# Patient Record
Sex: MALE | Race: WHITE | NOT HISPANIC OR LATINO | Employment: STUDENT | URBAN - NONMETROPOLITAN AREA
[De-identification: names, ages, dates, MRNs, and addresses within clinical notes are randomized per-mention and may not be internally consistent; named-entity substitution may affect disease eponyms.]

---
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
## 2025-01-09 ENCOUNTER — PATIENT OUTREACH (OUTPATIENT)
Dept: CARE COORDINATION | Facility: CLINIC | Age: 20
End: 2025-01-09
Payer: COMMERCIAL

## 2025-02-02 SDOH — HEALTH STABILITY: PHYSICAL HEALTH: ON AVERAGE, HOW MANY MINUTES DO YOU ENGAGE IN EXERCISE AT THIS LEVEL?: 90 MIN

## 2025-02-02 SDOH — HEALTH STABILITY: PHYSICAL HEALTH: ON AVERAGE, HOW MANY DAYS PER WEEK DO YOU ENGAGE IN MODERATE TO STRENUOUS EXERCISE (LIKE A BRISK WALK)?: 7 DAYS

## 2025-02-02 ASSESSMENT — SOCIAL DETERMINANTS OF HEALTH (SDOH): HOW OFTEN DO YOU GET TOGETHER WITH FRIENDS OR RELATIVES?: MORE THAN THREE TIMES A WEEK

## 2025-02-03 ENCOUNTER — OFFICE VISIT (OUTPATIENT)
Dept: FAMILY MEDICINE | Facility: CLINIC | Age: 20
End: 2025-02-03
Attending: PHYSICIAN ASSISTANT
Payer: COMMERCIAL

## 2025-02-03 VITALS
SYSTOLIC BLOOD PRESSURE: 122 MMHG | HEIGHT: 72 IN | WEIGHT: 131 LBS | OXYGEN SATURATION: 98 % | BODY MASS INDEX: 17.74 KG/M2 | HEART RATE: 96 BPM | DIASTOLIC BLOOD PRESSURE: 70 MMHG | TEMPERATURE: 98.6 F

## 2025-02-03 DIAGNOSIS — K21.00 GASTROESOPHAGEAL REFLUX DISEASE WITH ESOPHAGITIS WITHOUT HEMORRHAGE: ICD-10-CM

## 2025-02-03 DIAGNOSIS — E55.9 VITAMIN D DEFICIENCY: ICD-10-CM

## 2025-02-03 DIAGNOSIS — Z00.00 ROUTINE GENERAL MEDICAL EXAMINATION AT A HEALTH CARE FACILITY: Primary | ICD-10-CM

## 2025-02-03 DIAGNOSIS — K58.1 IRRITABLE BOWEL SYNDROME WITH CONSTIPATION: ICD-10-CM

## 2025-02-03 DIAGNOSIS — R17 SERUM TOTAL BILIRUBIN ELEVATED: ICD-10-CM

## 2025-02-03 LAB
ALBUMIN SERPL BCG-MCNC: 4.5 G/DL (ref 3.5–5.2)
ALP SERPL-CCNC: 84 U/L (ref 40–150)
ALT SERPL W P-5'-P-CCNC: 17 U/L (ref 0–70)
ANION GAP SERPL CALCULATED.3IONS-SCNC: 11 MMOL/L (ref 7–15)
AST SERPL W P-5'-P-CCNC: 13 U/L (ref 0–45)
BILIRUB SERPL-MCNC: 0.9 MG/DL
BUN SERPL-MCNC: 19.9 MG/DL (ref 6–20)
CALCIUM SERPL-MCNC: 9.5 MG/DL (ref 8.8–10.4)
CHLORIDE SERPL-SCNC: 104 MMOL/L (ref 98–107)
CREAT SERPL-MCNC: 0.74 MG/DL (ref 0.67–1.17)
EGFRCR SERPLBLD CKD-EPI 2021: >90 ML/MIN/1.73M2
GLUCOSE SERPL-MCNC: 75 MG/DL (ref 70–99)
HCO3 SERPL-SCNC: 25 MMOL/L (ref 22–29)
POTASSIUM SERPL-SCNC: 4.2 MMOL/L (ref 3.4–5.3)
PROT SERPL-MCNC: 6.6 G/DL (ref 6.4–8.3)
SODIUM SERPL-SCNC: 140 MMOL/L (ref 135–145)
VIT D+METAB SERPL-MCNC: 17 NG/ML (ref 20–50)

## 2025-02-03 PROCEDURE — 80053 COMPREHEN METABOLIC PANEL: CPT | Performed by: PHYSICIAN ASSISTANT

## 2025-02-03 PROCEDURE — 36415 COLL VENOUS BLD VENIPUNCTURE: CPT | Performed by: PHYSICIAN ASSISTANT

## 2025-02-03 PROCEDURE — 82306 VITAMIN D 25 HYDROXY: CPT | Performed by: PHYSICIAN ASSISTANT

## 2025-02-03 PROCEDURE — 99395 PREV VISIT EST AGE 18-39: CPT | Performed by: PHYSICIAN ASSISTANT

## 2025-02-03 PROCEDURE — 99214 OFFICE O/P EST MOD 30 MIN: CPT | Mod: 25 | Performed by: PHYSICIAN ASSISTANT

## 2025-02-03 RX ORDER — PANTOPRAZOLE SODIUM 40 MG/1
40 TABLET, DELAYED RELEASE ORAL DAILY
Qty: 90 TABLET | Refills: 0 | Status: SHIPPED | OUTPATIENT
Start: 2025-02-03

## 2025-02-03 RX ORDER — CETIRIZINE HYDROCHLORIDE 5 MG/1
5 TABLET ORAL DAILY
COMMUNITY

## 2025-02-03 NOTE — PATIENT INSTRUCTIONS
Continue the zyrtec as you have been doing - this is a very safe medication and it seems to help him so okay to continue    Try the protonix (pantroprazole) - this would be in place of the omeprazole that he did not tolerate. See how things go over the next 2-4 weeks but if he has the same issues as he did with omeprazole then go ahead and stop      Okay to continue the bowel regimen you have been doing to keep him regular        Patient Education   Preventive Care Advice   This is general advice given by our system to help you stay healthy. However, your care team may have specific advice just for you. Please talk to your care team about your preventive care needs.  Nutrition  Eat 5 or more servings of fruits and vegetables each day.  Try wheat bread, brown rice and whole grain pasta (instead of white bread, rice, and pasta).  Get enough calcium and vitamin D. Check the label on foods and aim for 100% of the RDA (recommended daily allowance).  Lifestyle  Exercise at least 150 minutes each week  (30 minutes a day, 5 days a week).  Do muscle strengthening activities 2 days a week. These help control your weight and prevent disease.  No smoking.  Wear sunscreen to prevent skin cancer.  Have a dental exam and cleaning every 6 months.  Yearly exams  See your health care team every year to talk about:  Any changes in your health.  Any medicines your care team has prescribed.  Preventive care, family planning, and ways to prevent chronic diseases.  Shots (vaccines)   HPV shots (up to age 26), if you've never had them before.  Hepatitis B shots (up to age 59), if you've never had them before.  COVID-19 shot: Get this shot when it's due.  Flu shot: Get a flu shot every year.  Tetanus shot: Get a tetanus shot every 10 years.  Pneumococcal, hepatitis A, and RSV shots: Ask your care team if you need these based on your risk.  Shingles shot (for age 50 and up)  General health tests  Diabetes screening:  Starting at age 35, Get  screened for diabetes at least every 3 years.  If you are younger than age 35, ask your care team if you should be screened for diabetes.  Cholesterol test: At age 39, start having a cholesterol test every 5 years, or more often if advised.  Bone density scan (DEXA): At age 50, ask your care team if you should have this scan for osteoporosis (brittle bones).  Hepatitis C: Get tested at least once in your life.  STIs (sexually transmitted infections)  Before age 24: Ask your care team if you should be screened for STIs.  After age 24: Get screened for STIs if you're at risk. You are at risk for STIs (including HIV) if:  You are sexually active with more than one person.  You don't use condoms every time.  You or a partner was diagnosed with a sexually transmitted infection.  If you are at risk for HIV, ask about PrEP medicine to prevent HIV.  Get tested for HIV at least once in your life, whether you are at risk for HIV or not.  Cancer screening tests  Cervical cancer screening: If you have a cervix, begin getting regular cervical cancer screening tests starting at age 21.  Breast cancer scan (mammogram): If you've ever had breasts, begin having regular mammograms starting at age 40. This is a scan to check for breast cancer.  Colon cancer screening: It is important to start screening for colon cancer at age 45.  Have a colonoscopy test every 10 years (or more often if you're at risk) Or, ask your provider about stool tests like a FIT test every year or Cologuard test every 3 years.  To learn more about your testing options, visit:   .  For help making a decision, visit:   https://bit.ly/kc37738.  Prostate cancer screening test: If you have a prostate, ask your care team if a prostate cancer screening test (PSA) at age 55 is right for you.  Lung cancer screening: If you are a current or former smoker ages 50 to 80, ask your care team if ongoing lung cancer screenings are right for you.  For informational purposes  only. Not to replace the advice of your health care provider. Copyright   2023 [facility]. All rights reserved. Clinically reviewed by the [facility]. [employer] [id number] - REV 01/24.  Learning About Stress  What is stress?     Stress is your body's response to a hard situation. Your body can have a physical, emotional, or mental response. Stress is a fact of life for most people, and it affects everyone differently. What causes stress for you may not be stressful for someone else.  A lot of things can cause stress. You may feel stress when you go on a job interview, take a test, or run a race. This kind of short-term stress is normal and even useful. It can help you if you need to work hard or react quickly. For example, stress can help you finish an important job on time.  Long-term stress is caused by ongoing stressful situations or events. Examples of long-term stress include long-term health problems, ongoing problems at work, or conflicts in your family. Long-term stress can harm your health.  How does stress affect your health?  When you are stressed, your body responds as though you are in danger. It makes hormones that speed up your heart, make you breathe faster, and give you a burst of energy. This is called the fight-or-flight stress response. If the stress is over quickly, your body goes back to normal and no harm is done.  But if stress happens too often or lasts too long, it can have bad effects. Long-term stress can make you more likely to get sick, and it can make symptoms of some diseases worse. If you tense up when you are stressed, you may develop neck, shoulder, or low back pain. Stress is linked to high blood pressure and heart disease.  Stress also harms your emotional health. It can make you barnett, tense, or depressed. Your relationships may suffer, and you may not do well at work or school.  What can you do to manage stress?  You can try these things to  help manage stress:   Do something active. Exercise or activity can help reduce stress. Walking is a great way to get started. Even everyday activities such as housecleaning or yard work can help.  Try yoga or roni chi. These techniques combine exercise and meditation. You may need some training at first to learn them.  Do something you enjoy. For example, listen to music or go to a movie. Practice your hobby or do volunteer work.  Meditate. This can help you relax, because you are not worrying about what happened before or what may happen in the future.  Do guided imagery. Imagine yourself in any setting that helps you feel calm. You can use online videos, books, or a teacher to guide you.  Do breathing exercises. For example:  From a standing position, bend forward from the waist with your knees slightly bent. Let your arms dangle close to the floor.  Breathe in slowly and deeply as you return to a standing position. Roll up slowly and lift your head last.  Hold your breath for just a few seconds in the standing position.  Breathe out slowly and bend forward from the waist.  Let your feelings out. Talk, laugh, cry, and express anger when you need to. Talking with supportive friends or family, a counselor, or a lópez leader about your feelings is a healthy way to relieve stress. Avoid discussing your feelings with people who make you feel worse.  Write. It may help to write about things that are bothering you. This helps you find out how much stress you feel and what is causing it. When you know this, you can find better ways to cope.  What can you do to prevent stress?  You might try some of these things to help prevent stress:  Manage your time. This helps you find time to do the things you want and need to do.  Get enough sleep. Your body recovers from the stresses of the day while you are sleeping.  Get support. Your family, friends, and community can make a difference in how you experience stress.  Limit your  "news feed. Avoid or limit time on social media or news that may make you feel stressed.  Do something active. Exercise or activity can help reduce stress. Walking is a great way to get started.  Where can you learn more?  Go to https://www.[employer].net/patiented  Enter N032 in the search box to learn more about \"Learning About Stress.\"  Current as of: October 24, 2023  Content Version: 14.3    2024 [employer].   Care instructions adapted under license by your healthcare professional. If you have questions about a medical condition or this instruction, always ask your healthcare professional. [employer] disclaims any warranty or liability for your use of this information.       "

## 2025-02-03 NOTE — PROGRESS NOTES
Preventive Care Visit  [facility] MARQUES Steve PA-C, Family Medicine  Feb 3, 2025      Assessment & Plan     (Z00.00) Routine general medical examination at a health care facility  (primary encounter diagnosis)  Comment:   Plan:     (K21.00) Gastroesophageal reflux disease with esophagitis without hemorrhage  Comment: did not tolerate omeprazole. Will try pantoprazole to see if that helps with gut issues although they do seem to be managing with his current bowel regimen and zyrtec so if pantoprazole not tolerated or does not add any benefit, would discontinue  Plan: pantoprazole (PROTONIX) 40 MG EC tablet            (E55.9) Vitamin D deficiency  Comment: previous level low. Did high dose replacement but has not been on anything since completing the 12 weeks  Plan: Vitamin D Deficiency            (R17) Serum total bilirubin elevated  Comment: follow up - given we are attempting labs today for the vitamin D, will recheck liver panel as well  Plan: Comprehensive metabolic panel (BMP + Alb, Alk         Phos, ALT, AST, Total. Bili, TP)            (K58.1) Irritable bowel syndrome with constipation  Comment:   Plan: continue current regimen. Scheduled to follow up with GI in April         Patient has been advised of split billing requirements and indicates understanding: Yes        Counseling  Appropriate preventive services were addressed with this patient via screening, questionnaire, or discussion as appropriate for fall prevention, nutrition, physical activity, Tobacco-use cessation, social engagement, weight loss and cognition.  Checklist reviewing preventive services available has been given to the patient.  Reviewed patient's diet, addressing concerns and/or questions.   He is at risk for psychosocial distress and has been provided with information to reduce risk.       Grzegorz Benson is a 20 year old, presenting for the following:  Physical        2/3/2025     8:13 AM   Additional  Questions   Roomed by PHUC Ramirez          HPI    -Mom would like to talk about follow up labs.     After his annual visit last year got sick  Was losing weight, not eating  Ended up having an EGD that showed reflux esophagitis  Mom states they never really explained why he was having all the bowel issues  She feels he was so backed up he wouldn't eat - ? Gastroparesis  Bilirubin was elevated  Vitamin D level was low - he did take the high dose vitamin D for 12 weeks but has not been on anything since completing that  They do have a follow up with GI in April     Mom has been doing the following bowel regimen:   2-3 colace daily, Every 2+ weeks he will have a decreased appetite and she can tell that he is getting more backed up despite the stool softeners so will give him a laxative that clears everything out and then he will be okay. At most doing this 2x per month  They have tried miralax in the past but that didn't seem to work for him    She had him on omeprazole as advised after the EGD but he started to refuse to take them and spit them out and then they were noticing some more unusual behaviors from him. Wasn't sleeping well, very restless. These improved when she stopped the omeprazole    They had tried famotidine a year ago when he was having all the symptoms and she said it didn't work for him    Mom has been giving him zyrtec and she feels like this helps - she isn't sure how or why but when she stops giving it he will start to clear his throat and not eat as much like he was doing before      Health Care Directive  Patient has a Health Care Directive on file  Advance care planning document is on file and is current.      2/2/2025   General Health   How would you rate your overall physical health? Good    Feel stress (tense, anxious, or unable to sleep) Rather much        Proxy-reported   (!) STRESS CONCERN      2/2/2025   Nutrition   Three or more servings of calcium each day? Yes    Diet: Regular (no  restrictions)    How many servings of fruit and vegetables per day? (!) 2-3    How many sweetened beverages each day? 0-1        Proxy-reported         2/2/2025   Exercise   Days per week of moderate/strenous exercise 7 days    Average minutes spent exercising at this level 90 min        Proxy-reported         2/2/2025   Social Factors   Frequency of gathering with friends or relatives More than three times a week    Worry food won't last until get money to buy more No    Food not last or not have enough money for food? No    Do you have housing? (Housing is defined as stable permanent housing and does not include staying ouside in a car, in a tent, in an abandoned building, in an overnight shelter, or couch-surfing.) Yes    Are you worried about losing your housing? No    Lack of transportation? No    Unable to get utilities (heat,electricity)? No        Proxy-reported         2/2/2025   Dental   Dentist two times every year? Yes        Proxy-reported         2/2/2025   TB Screening   Were you born outside of the [place]? No        Proxy-reported         Today's PHQ-2 Score:       2/2/2025    12:30 PM   PHQ-2 ( 1999 [employer])   PHQ-2 Score Incomplete        Proxy-reported           2/2/2025   Substance Use   Alcohol more than 3/day or more than 7/wk Not Applicable    Do you use any other substances recreationally? No        Proxy-reported     Social History     Tobacco Use    Smoking status: Never    Smokeless tobacco: Never   Vaping Use    Vaping status: Never Used   Substance Use Topics    Alcohol use: No     Alcohol/week: 0.0 standard drinks of alcohol    Drug use: No           2/2/2025   STI Screening   New sexual partner(s) since last STI/HIV test? No        Proxy-reported         2/2/2025   Contraception/Family Planning   Questions about contraception or family planning No        Proxy-reported       Reviewed and updated as needed this visit by Provider                    BP Readings from Last 3 Encounters:  "  02/03/25 122/70   02/26/24 107/69   02/22/24 110/60    Wt Readings from Last 3 Encounters:   02/03/25 59.4 kg (131 lb)   05/13/24 52.4 kg (115 lb 8 oz) (2%, Z= -2.05)*   02/26/24 50.8 kg (112 lb) (1%, Z= -2.28)*     * Growth percentiles are based on [facility] (Boys, 2-20 Years) data.                      Review of Systems  Constitutional, HEENT, cardiovascular, pulmonary, GI, , musculoskeletal, neuro, skin, endocrine and psych systems are negative, except as otherwise noted.     Objective    Exam  /70   Pulse 96   Temp 98.6  F (37  C) (Tympanic)   Ht 1.816 m (5' 11.5\")   Wt 59.4 kg (131 lb)   SpO2 98%   BMI 18.02 kg/m     Estimated body mass index is 18.02 kg/m  as calculated from the following:    Height as of this encounter: 1.816 m (5' 11.5\").    Weight as of this encounter: 59.4 kg (131 lb).    Physical Exam  GENERAL: alert and no distress  EYES: Eyes grossly normal to inspection, PERRL and conjunctivae and sclerae normal  HENT: ear canals and TM's normal, nose and mouth without ulcers or lesions  NECK: no adenopathy, no asymmetry, masses, or scars  RESP: lungs clear to auscultation - no rales, rhonchi or wheezes  CV: regular rate and rhythm, normal S1 S2, no S3 or S4, no murmur, click or rub, no peripheral edema  ABDOMEN: soft, nontender, no hepatosplenomegaly, no masses and bowel sounds normal  MS: no gross musculoskeletal defects noted, no edema  SKIN: no suspicious lesions or rashes  NEURO: Normal strength and tone, mentation intact and speech normal  PSYCH: mentation appears normal, affect normal/bright        Signed Electronically by: Rachel Steve PA-C    "

## 2025-02-04 DIAGNOSIS — E55.9 VITAMIN D DEFICIENCY: Primary | ICD-10-CM

## 2025-02-04 RX ORDER — ERGOCALCIFEROL 1.25 MG/1
50000 CAPSULE, LIQUID FILLED ORAL WEEKLY
Qty: 12 CAPSULE | Refills: 0 | Status: SHIPPED | OUTPATIENT
Start: 2025-02-04

## 2025-04-16 ENCOUNTER — OFFICE VISIT (OUTPATIENT)
Dept: GASTROENTEROLOGY | Facility: CLINIC | Age: 20
End: 2025-04-16
Payer: COMMERCIAL

## 2025-04-16 VITALS
HEIGHT: 72 IN | WEIGHT: 136.2 LBS | BODY MASS INDEX: 18.45 KG/M2 | HEART RATE: 73 BPM | SYSTOLIC BLOOD PRESSURE: 106 MMHG | OXYGEN SATURATION: 97 % | DIASTOLIC BLOOD PRESSURE: 66 MMHG

## 2025-04-16 DIAGNOSIS — R46.89 BEHAVIOR CONCERN: ICD-10-CM

## 2025-04-16 DIAGNOSIS — K58.1 IRRITABLE BOWEL SYNDROME WITH CONSTIPATION: Primary | ICD-10-CM

## 2025-04-16 DIAGNOSIS — R13.10 DYSPHAGIA, UNSPECIFIED TYPE: ICD-10-CM

## 2025-04-16 DIAGNOSIS — R63.39 FOOD AVERSION: ICD-10-CM

## 2025-04-16 DIAGNOSIS — R63.4 WEIGHT LOSS: ICD-10-CM

## 2025-04-16 NOTE — NURSING NOTE
"Chief Complaint   Patient presents with    Follow Up     Dysphagia, weight loss.      He requests these members of his care team be copied on today's visit information:  PCP: Rachel Steve    Referring Provider:  Referred Self, MD  No address on file    Vitals:    04/16/25 1123   BP: 106/66   BP Location: Left arm   Patient Position: Sitting   Cuff Size: Adult Small   Pulse: 73   SpO2: 97%   Weight: 61.8 kg (136 lb 3.2 oz)   Height: 1.816 m (5' 11.5\")     Body mass index is 18.73 kg/m .    Do you have a history of colon cancer in your immediate family? NO      Medications were reconciled.    Does patient need any medication refills at today's visit? No        Iveth River       "

## 2025-04-16 NOTE — LETTER
4/16/2025      Marcelino Ventura  [address]  [facility] [address]      Dear Colleague,    Thank you for referring your patient, Marcelino Ventura, to the [facility]. Please see a copy of my visit note below.    FOLLOW UP GI CLINIC VISIT    CC/REFERRING MD:  Referred Self  REASON FOR CONSULTATION:   Referred Self for   Chief Complaint   Patient presents with     Follow Up     Dysphagia, weight loss.        ASSESSMENT/PLAN: Marcelino is here for follow-up regarding a few concerns.  Over the past year, weight has improved as oral intake has started to normalize.  There are still some spells of indigestion and throat clearing, which seem to correlate with spells of constipation.  Maximizing OTC laxatives has only partially improved stool output.  At this time, I would recommend transition to a secretagogue, discussed the risk benefits of using something like Linzess.  They will hold other laxatives for at least 3 days when starting.  Recommend staying off PPIs for now, given behavioral change with them.  Hopefully, maintaining good stool output will resolve any upper GI distress as well.  Will follow-up over the [facility] with Esme in a couple of weeks, they were encouraged to contact me sooner with any questions or concerns.    1. Irritable bowel syndrome with constipation (Primary)  - linaclotide (LINZESS) 145 MCG capsule; Take 1 capsule (145 mcg) by mouth every morning (before breakfast).  Dispense: 30 capsule; Refill: 1    2. Dysphagia, unspecified type    3. Food aversion    4. Weight loss    5. Behavior concern    Thank you for this consultation.  It was a pleasure to participate in the care of this patient; please contact us with any further questions.      25 minutes spent on the date of the encounter doing chart review, patient visit, and documentation    This note was created with voice recognition software, and while reviewed for accuracy, typos may remain.     Bernard Chambers PA-C  Division of  Gastroenterology, Hepatology and Nutrition  [facility]  Marcelino Ventura is a 20 year old male that is seen for follow up in the GI clinic today.  Marcelino is accompanied by his mother, Esme, who helps provide most of the history. Marcelino has a medical history significant for autism spectrum disorder, mostly nonverbal.  I initially met with Marcelino a little over a year ago due to concerns of poor oral intake with associated unintentional weight loss of roughly 15 pounds.  He was only drinking small sips of water and generally refusing solids.  There had been less frequent urination and stool output.  Workup with PCP had included CT CAP which was essentially normal.  Laboratory testing to look for metabolic dysfunction was normal as well.  Previous evaluation with pediatric GI had been suggestive of IBS-C with associated nausea.    We expanded his workup after that to assess for nutritional deficiency and this largely came back normal.  We did abdominal x-ray which was notable for moderate to large stool burden.  We had them start working on some laxative therapies to improve stool output.  From there, we also referred him for an EGD which was notable for LA grade a reflux esophagitis with esophageal biopsies compatible with reflux.  Normal-appearing stomach and duodenum.    When I last met with Esme in [place] in May 2024, his appetite had been improving and oral intake getting better.  We had tried some cyproheptadine but this caused drowsiness.  He was getting a little bit better stool output with daily use of laxatives.    Over the past year, appetite and oral intake has continued to improve, now up to 136 pounds.  We had recommended omeprazole for his reflux esophagitis, but this was causing some behavioral change, so they discontinued.  Pantoprazole was tried by PCP but this has not seemed to help much with indigestion and throat clearing.  What they have noticed is  that the upper GI distress seems to come along with spells of constipation.  Despite daily use of stool softener and magnesium supplement, still needing to do a bowel cleanse with Dulcolax at least once per month, sometimes more frequently than that.  This seems to clear up the throat clearing and upper digestive symptoms.    ROS:    10 point ROS neg other than the symptoms noted above in the HPI.    PREVIOUS ENDOSCOPY:  Reviewed, see HPI    PERTINENT RELEVANT IMAGING OR LABS:  Reviewed    ALLERGIES:     Allergies   Allergen Reactions     Dust Mites      unknown     No Clinical Screening - See Comments Unknown     Sesame seeds     Peanuts  [Nuts] Unknown     Shellfish-Derived Products Unknown     Trees      Unknown  Pine trees        PERTINENT MEDICATIONS:    Current Outpatient Medications:      bisacodyl (DULCOLAX) 5 MG EC tablet, Take 5 mg by mouth daily as needed for constipation, Disp: , Rfl:      cetirizine (ZYRTEC) 5 MG tablet, Take 5 mg by mouth daily., Disp: , Rfl:      linaclotide (LINZESS) 145 MCG capsule, Take 1 capsule (145 mcg) by mouth every morning (before breakfast)., Disp: 30 capsule, Rfl: 1     magnesium chloride 535 (64 Mg) MG TBEC CR tablet, Take 535 mg by mouth daily., Disp: , Rfl:      pantoprazole (PROTONIX) 40 MG EC tablet, Take 1 tablet (40 mg) by mouth daily., Disp: 90 tablet, Rfl: 0     Probiotic Product (PROBIOTIC PO), Take by mouth daily, Disp: , Rfl:      vitamin D2 (ERGOCALCIFEROL) 50999 units (1250 mcg) capsule, Take 1 capsule (50,000 Units) by mouth once a week., Disp: 12 capsule, Rfl: 0    PROBLEM LIST  Patient Active Problem List    Diagnosis Date Noted     Protein-calorie malnutrition (H) 02/06/2024     Priority: Medium     Nausea and vomiting, intractability of vomiting not specified, unspecified vomiting type 04/04/2022     Priority: Medium     Weight loss 12/06/2021     Priority: Medium     Irritable bowel syndrome with constipation 12/06/2021     Priority: Medium     Behavior  concern 12/13/2016     Priority: Medium     Screening for cholesterol level 02/10/2014     Priority: Medium     Overview:   Cholesterol was screened at age 6yrs and is low, we will not need to repeat at 9-11yrs. Gayathri Freeman MD ....................  2/10/2014   10:09 AM       Allergic rhinitis 08/15/2013     Priority: Medium     Seems to be year round.  Referred to allergist as symptoms are very annoying to Marcelino.  Signed by Gayathri Jon....6/7/2018 2:41 PM         Autism spectrum disorder 02/15/2013     Priority: Medium     Has started talking.  Up to 5 word sentences.    Attitude seems to have stabilized since puberty started.    Has services through school.  Speech and adaptive physcial fitness  Quarterly OT services.        Delayed immunizations 02/15/2013     Priority: Medium     Overview:   Marcelino had high fevers with previous shots and had a rapid decline in functioning.  Parents are declining further shots.  Gayathri Freeman MD ....................  2/15/2013   10:08 AM       Food allergy 02/15/2013     Priority: Medium     Overview:   Peanut, sesame and seafood.  True allergy.  Mom has him on Casien and Gluten free diet, his constipation and GI issues have resolved. Signed by Gayathri Jon....10/20/2016 9:50 AM  Trying to reintroduce dairy, able to tolerate trace amounts.  Signed by Gayathri Jon....6/7/2018 1:44 PM'         PERTINENT PAST MEDICAL HISTORY:  Past Medical History:   Diagnosis Date     Dermatitis due to food taken internally     2/15/2013,Peanut, sesame and seafood.  Gets red ears.     Gastro-esophageal reflux disease without esophagitis     No Comments Provided     Noninfective gastroenteritis and colitis     No Comments Provided     Noninfective gastroenteritis and colitis     2/15/2013,Had been seeing GI once a year.   Mom treats constipation issues  with Miralax AS NEEDED.     Underimmunization status     2/15/2013,Marcelino had high fevers with previous shots and had a  rapid decline in functioning.  Parents are declining further shots.  Gayathri Freeman MD ....................  2/15/2013   10:08 AM       PREVIOUS SURGERIES:  Past Surgical History:   Procedure Laterality Date     ESOPHAGOSCOPY, GASTROSCOPY, DUODENOSCOPY (EGD), COMBINED N/A 2/26/2024    Procedure: ESOPHAGOGASTRODUODENOSCOPY, WITH BIOPSY;  Surgeon: Taylor Muhammad MD;  Location: [facility]     MYRINGOTOMY, INSERT TUBE, COMBINED      No Comments Provided     OTHER SURGICAL HISTORY      2012,[id number],COLONOSCOPY AND EGD NEW [place] ONLY       SOCIAL HISTORY:  Social History     Socioeconomic History     Marital status: Single     Spouse name: Not on file     Number of children: Not on file     Years of education: Not on file     Highest education level: Not on file   Occupational History     Not on file   Tobacco Use     Smoking status: Never     Smokeless tobacco: Never   Vaping Use     Vaping status: Never Used   Substance and Sexual Activity     Alcohol use: No     Alcohol/week: 0.0 standard drinks of alcohol     Drug use: No     Sexual activity: Never   Other Topics Concern     Not on file   Social History Narrative    Mom- Esme. RN, now at home.  Dad- Arslan, , now manager of [employer] in [place]. Retired .     Social Drivers of Health     Financial Resource Strain: Low Risk  (2/2/2025)    Financial Resource Strain      Within the past 12 months, have you or your family members you live with been unable to get utilities (heat, electricity) when it was really needed?: No   Food Insecurity: Low Risk  (2/2/2025)    Food Insecurity      Within the past 12 months, did you worry that your food would run out before you got money to buy more?: No      Within the past 12 months, did the food you bought just not last and you didn t have money to get more?: No   Transportation Needs: Low Risk  (2/2/2025)    Transportation Needs      Within the past 12 months, has lack of transportation kept you from medical  "appointments, getting your medicines, non-medical meetings or appointments, work, or from getting things that you need?: No   Physical Activity: Sufficiently Active (2/2/2025)    Exercise Vital Sign      Days of Exercise per Week: 7 days      Minutes of Exercise per Session: 90 min   Stress: Stress Concern Present (2/2/2025)    [place] [employer] - Occupational Stress Questionnaire      Feeling of Stress : Rather much   Social Connections: Unknown (2/2/2025)    Social Connection and Isolation Panel [NHANES]      Frequency of Communication with Friends and Family: Not on file      Frequency of Social Gatherings with Friends and Family: More than three times a week      Attends Tenriism Services: Not on file      Active Member of Clubs or Organizations: Not on file      Attends Club or Organization Meetings: Not on file      Marital Status: Not on file   Interpersonal Safety: Not on file   Housing Stability: Low Risk  (2/2/2025)    Housing Stability      Do you have housing? : Yes      Are you worried about losing your housing?: No       FAMILY HISTORY:  Family History   Problem Relation Age of Onset     Other - See Comments Mother         No Known Problems     Other - See Comments Father         No Known Problems     Other - See Comments Brother         No Known Problems       Past/family/social history reviewed and no changes    PHYSICAL EXAMINATION:  Constitutional: aaox3, cooperative, pleasant, not dyspneic/diaphoretic, no acute distress  Vitals reviewed: /66 (BP Location: Left arm, Patient Position: Sitting, Cuff Size: Adult Small)   Pulse 73   Ht 1.816 m (5' 11.5\")   Wt 61.8 kg (136 lb 3.2 oz)   SpO2 97%   BMI 18.73 kg/m    Wt:   Wt Readings from Last 2 Encounters:   04/16/25 61.8 kg (136 lb 3.2 oz)   02/03/25 59.4 kg (131 lb)      Eyes: Sclera anicteric/injected  CV: No edema  Respiratory: Unlabored breathing  Skin: warm, perfused, no jaundice  MSK: Normal " gait                      Again, thank you for allowing me to participate in the care of your patient.        Sincerely,        Bernard Chambers PA-C    Electronically signed

## 2025-04-16 NOTE — PROGRESS NOTES
FOLLOW UP GI CLINIC VISIT    CC/REFERRING MD:  Referred Self  REASON FOR CONSULTATION:   Referred Self for   Chief Complaint   Patient presents with    Follow Up     Dysphagia, weight loss.        ASSESSMENT/PLAN: Marcelino is here for follow-up regarding a few concerns.  Over the past year, weight has improved as oral intake has started to normalize.  There are still some spells of indigestion and throat clearing, which seem to correlate with spells of constipation.  Maximizing OTC laxatives has only partially improved stool output.  At this time, I would recommend transition to a secretagogue, discussed the risk benefits of using something like Linzess.  They will hold other laxatives for at least 3 days when starting.  Recommend staying off PPIs for now, given behavioral change with them.  Hopefully, maintaining good stool output will resolve any upper GI distress as well.  Will follow-up over the [facility] with Esme in a couple of weeks, they were encouraged to contact me sooner with any questions or concerns.    1. Irritable bowel syndrome with constipation (Primary)  - linaclotide (LINZESS) 145 MCG capsule; Take 1 capsule (145 mcg) by mouth every morning (before breakfast).  Dispense: 30 capsule; Refill: 1    2. Dysphagia, unspecified type    3. Food aversion    4. Weight loss    5. Behavior concern    Thank you for this consultation.  It was a pleasure to participate in the care of this patient; please contact us with any further questions.      25 minutes spent on the date of the encounter doing chart review, patient visit, and documentation    This note was created with voice recognition software, and while reviewed for accuracy, typos may remain.     Bernard Chambers PA-C  Division of Gastroenterology, Hepatology and Nutrition  [facility]  Marcelino Ventura is a 20 year old male that is seen for follow up in the GI clinic today.  Marcelino is accompanied by his mother,  Esme, who helps provide most of the history. Marcelino has a medical history significant for autism spectrum disorder, mostly nonverbal.  I initially met with Marcelino a little over a year ago due to concerns of poor oral intake with associated unintentional weight loss of roughly 15 pounds.  He was only drinking small sips of water and generally refusing solids.  There had been less frequent urination and stool output.  Workup with PCP had included CT CAP which was essentially normal.  Laboratory testing to look for metabolic dysfunction was normal as well.  Previous evaluation with pediatric GI had been suggestive of IBS-C with associated nausea.    We expanded his workup after that to assess for nutritional deficiency and this largely came back normal.  We did abdominal x-ray which was notable for moderate to large stool burden.  We had them start working on some laxative therapies to improve stool output.  From there, we also referred him for an EGD which was notable for LA grade a reflux esophagitis with esophageal biopsies compatible with reflux.  Normal-appearing stomach and duodenum.    When I last met with Esme in [place] in May 2024, his appetite had been improving and oral intake getting better.  We had tried some cyproheptadine but this caused drowsiness.  He was getting a little bit better stool output with daily use of laxatives.    Over the past year, appetite and oral intake has continued to improve, now up to 136 pounds.  We had recommended omeprazole for his reflux esophagitis, but this was causing some behavioral change, so they discontinued.  Pantoprazole was tried by PCP but this has not seemed to help much with indigestion and throat clearing.  What they have noticed is that the upper GI distress seems to come along with spells of constipation.  Despite daily use of stool softener and magnesium supplement, still needing to do a bowel cleanse with Dulcolax at least once per month, sometimes more frequently  than that.  This seems to clear up the throat clearing and upper digestive symptoms.    ROS:    10 point ROS neg other than the symptoms noted above in the HPI.    PREVIOUS ENDOSCOPY:  Reviewed, see HPI    PERTINENT RELEVANT IMAGING OR LABS:  Reviewed    ALLERGIES:     Allergies   Allergen Reactions    Dust Mites      unknown    No Clinical Screening - See Comments Unknown     Sesame seeds    Peanuts  [Nuts] Unknown    Shellfish-Derived Products Unknown    Trees      Unknown  Pine trees        PERTINENT MEDICATIONS:    Current Outpatient Medications:     bisacodyl (DULCOLAX) 5 MG EC tablet, Take 5 mg by mouth daily as needed for constipation, Disp: , Rfl:     cetirizine (ZYRTEC) 5 MG tablet, Take 5 mg by mouth daily., Disp: , Rfl:     linaclotide (LINZESS) 145 MCG capsule, Take 1 capsule (145 mcg) by mouth every morning (before breakfast)., Disp: 30 capsule, Rfl: 1    magnesium chloride 535 (64 Mg) MG TBEC CR tablet, Take 535 mg by mouth daily., Disp: , Rfl:     pantoprazole (PROTONIX) 40 MG EC tablet, Take 1 tablet (40 mg) by mouth daily., Disp: 90 tablet, Rfl: 0    Probiotic Product (PROBIOTIC PO), Take by mouth daily, Disp: , Rfl:     vitamin D2 (ERGOCALCIFEROL) 83148 units (1250 mcg) capsule, Take 1 capsule (50,000 Units) by mouth once a week., Disp: 12 capsule, Rfl: 0    PROBLEM LIST  Patient Active Problem List    Diagnosis Date Noted    Protein-calorie malnutrition (H) 02/06/2024     Priority: Medium    Nausea and vomiting, intractability of vomiting not specified, unspecified vomiting type 04/04/2022     Priority: Medium    Weight loss 12/06/2021     Priority: Medium    Irritable bowel syndrome with constipation 12/06/2021     Priority: Medium    Behavior concern 12/13/2016     Priority: Medium    Screening for cholesterol level 02/10/2014     Priority: Medium     Overview:   Cholesterol was screened at age 6yrs and is low, we will not need to repeat at 9-11yrs. Gayathri Freeman MD ....................   2/10/2014   10:09 AM      Allergic rhinitis 08/15/2013     Priority: Medium     Seems to be year round.  Referred to allergist as symptoms are very annoying to Marcelino.  Signed by Gayathri Jon....6/7/2018 2:41 PM        Autism spectrum disorder 02/15/2013     Priority: Medium     Has started talking.  Up to 5 word sentences.    Attitude seems to have stabilized since puberty started.    Has services through school.  Speech and adaptive physcial fitness  Quarterly OT services.       Delayed immunizations 02/15/2013     Priority: Medium     Overview:   Marcelino had high fevers with previous shots and had a rapid decline in functioning.  Parents are declining further shots.  Gayathri Freeman MD ....................  2/15/2013   10:08 AM      Food allergy 02/15/2013     Priority: Medium     Overview:   Peanut, sesame and seafood.  True allergy.  Mom has him on Casien and Gluten free diet, his constipation and GI issues have resolved. Signed by Gayathri Freeman MD .....10/20/2016 9:50 AM  Trying to reintroduce dairy, able to tolerate trace amounts.  Signed by Gayathri Freeman MD .....6/7/2018 1:44 PM'         PERTINENT PAST MEDICAL HISTORY:  Past Medical History:   Diagnosis Date    Dermatitis due to food taken internally     2/15/2013,Peanut, sesame and seafood.  Gets red ears.    Gastro-esophageal reflux disease without esophagitis     No Comments Provided    Noninfective gastroenteritis and colitis     No Comments Provided    Noninfective gastroenteritis and colitis     2/15/2013,Had been seeing GI once a year.   Mom treats constipation issues  with Miralax AS NEEDED.    Underimmunization status     2/15/2013,Marcelino had high fevers with previous shots and had a rapid decline in functioning.  Parents are declining further shots.  Gayathri Freeman MD ....................  2/15/2013   10:08 AM       PREVIOUS SURGERIES:  Past Surgical History:   Procedure Laterality Date    ESOPHAGOSCOPY, GASTROSCOPY, DUODENOSCOPY (EGD),  COMBINED N/A 2/26/2024    Procedure: ESOPHAGOGASTRODUODENOSCOPY, WITH BIOPSY;  Surgeon: Taylor Muhammad MD;  Location: [facility]    MYRINGOTOMY, INSERT TUBE, COMBINED      No Comments Provided    OTHER SURGICAL HISTORY      2012,[id number],COLONOSCOPY AND EGD NEW [place] ONLY       SOCIAL HISTORY:  Social History     Socioeconomic History    Marital status: Single     Spouse name: Not on file    Number of children: Not on file    Years of education: Not on file    Highest education level: Not on file   Occupational History    Not on file   Tobacco Use    Smoking status: Never    Smokeless tobacco: Never   Vaping Use    Vaping status: Never Used   Substance and Sexual Activity    Alcohol use: No     Alcohol/week: 0.0 standard drinks of alcohol    Drug use: No    Sexual activity: Never   Other Topics Concern    Not on file   Social History Narrative    Mom- Esme. RN, now at home.  Dad- Arslan, , now manager of [employer] in [place]. Retired .     Social Drivers of Health     Financial Resource Strain: Low Risk  (2/2/2025)    Financial Resource Strain     Within the past 12 months, have you or your family members you live with been unable to get utilities (heat, electricity) when it was really needed?: No   Food Insecurity: Low Risk  (2/2/2025)    Food Insecurity     Within the past 12 months, did you worry that your food would run out before you got money to buy more?: No     Within the past 12 months, did the food you bought just not last and you didn t have money to get more?: No   Transportation Needs: Low Risk  (2/2/2025)    Transportation Needs     Within the past 12 months, has lack of transportation kept you from medical appointments, getting your medicines, non-medical meetings or appointments, work, or from getting things that you need?: No   Physical Activity: Sufficiently Active (2/2/2025)    Exercise Vital Sign     Days of Exercise per Week: 7 days     Minutes of Exercise per Session: 90 min  "  Stress: Stress Concern Present (2/2/2025)    [place] of [employer]ational [employer] - Occupational Stress Questionnaire     Feeling of Stress : Rather much   Social Connections: Unknown (2/2/2025)    Social Connection and Isolation Panel [NHANES]     Frequency of Communication with Friends and Family: Not on file     Frequency of Social Gatherings with Friends and Family: More than three times a week     Attends Yazidism Services: Not on file     Active Member of Clubs or Organizations: Not on file     Attends Club or Organization Meetings: Not on file     Marital Status: Not on file   Interpersonal Safety: Not on file   Housing Stability: Low Risk  (2/2/2025)    Housing Stability     Do you have housing? : Yes     Are you worried about losing your housing?: No       FAMILY HISTORY:  Family History   Problem Relation Age of Onset    Other - See Comments Mother         No Known Problems    Other - See Comments Father         No Known Problems    Other - See Comments Brother         No Known Problems       Past/family/social history reviewed and no changes    PHYSICAL EXAMINATION:  Constitutional: aaox3, cooperative, pleasant, not dyspneic/diaphoretic, no acute distress  Vitals reviewed: /66 (BP Location: Left arm, Patient Position: Sitting, Cuff Size: Adult Small)   Pulse 73   Ht 1.816 m (5' 11.5\")   Wt 61.8 kg (136 lb 3.2 oz)   SpO2 97%   BMI 18.73 kg/m    Wt:   Wt Readings from Last 2 Encounters:   04/16/25 61.8 kg (136 lb 3.2 oz)   02/03/25 59.4 kg (131 lb)      Eyes: Sclera anicteric/injected  CV: No edema  Respiratory: Unlabored breathing  Skin: warm, perfused, no jaundice  MSK: Normal gait                    "

## 2025-05-19 ENCOUNTER — MYC MEDICAL ADVICE (OUTPATIENT)
Dept: GASTROENTEROLOGY | Facility: CLINIC | Age: 20
End: 2025-05-19
Payer: COMMERCIAL

## 2025-05-19 DIAGNOSIS — R19.5 STOOL COLOR ABNORMAL: Primary | ICD-10-CM

## 2025-05-20 ENCOUNTER — MYC MEDICAL ADVICE (OUTPATIENT)
Dept: FAMILY MEDICINE | Facility: CLINIC | Age: 20
End: 2025-05-20
Payer: COMMERCIAL

## 2025-05-20 DIAGNOSIS — E55.9 VITAMIN D DEFICIENCY: Primary | ICD-10-CM

## 2025-05-21 ENCOUNTER — LAB (OUTPATIENT)
Dept: LAB | Facility: CLINIC | Age: 20
End: 2025-05-21
Payer: COMMERCIAL

## 2025-05-21 DIAGNOSIS — R19.5 STOOL COLOR ABNORMAL: ICD-10-CM

## 2025-05-21 DIAGNOSIS — E55.9 VITAMIN D DEFICIENCY: ICD-10-CM

## 2025-05-21 LAB
ALBUMIN SERPL BCG-MCNC: 4.6 G/DL (ref 3.5–5.2)
ALP SERPL-CCNC: 87 U/L (ref 40–150)
ALT SERPL W P-5'-P-CCNC: 27 U/L (ref 0–70)
AST SERPL W P-5'-P-CCNC: 24 U/L (ref 0–45)
BILIRUB DIRECT SERPL-MCNC: 0.28 MG/DL (ref 0–0.3)
BILIRUB SERPL-MCNC: 1 MG/DL
PROT SERPL-MCNC: 6.9 G/DL (ref 6.4–8.3)

## 2025-05-21 PROCEDURE — 36415 COLL VENOUS BLD VENIPUNCTURE: CPT

## 2025-05-21 PROCEDURE — 82306 VITAMIN D 25 HYDROXY: CPT

## 2025-05-21 PROCEDURE — 80076 HEPATIC FUNCTION PANEL: CPT

## 2025-05-22 ENCOUNTER — RESULTS FOLLOW-UP (OUTPATIENT)
Dept: GASTROENTEROLOGY | Facility: CLINIC | Age: 20
End: 2025-05-22

## 2025-05-22 LAB — VIT D+METAB SERPL-MCNC: 39 NG/ML (ref 20–50)

## 2025-05-22 NOTE — RESULT ENCOUNTER NOTE
Liver function tests are normal, which is reassuring.  I suspect the stool color changes are not clinically significant.  If you are comfortable sending a picture through the MyChart of the stool color changes, I can take a look and evaluate further.    Sincerely,  Bernard ZAMBRANO [facility] GI, Hepatology, and Nutrition

## 2025-05-23 ENCOUNTER — RESULTS FOLLOW-UP (OUTPATIENT)
Dept: FAMILY MEDICINE | Facility: CLINIC | Age: 20
End: 2025-05-23

## 2025-06-11 ENCOUNTER — MYC REFILL (OUTPATIENT)
Dept: GASTROENTEROLOGY | Facility: CLINIC | Age: 20
End: 2025-06-11
Payer: COMMERCIAL

## 2025-06-11 DIAGNOSIS — K58.1 IRRITABLE BOWEL SYNDROME WITH CONSTIPATION: ICD-10-CM

## 2025-06-11 NOTE — TELEPHONE ENCOUNTER
Last Written Prescription:   Disp Refills Start End JOE   linaclotide (LINZESS) 145 MCG capsule 30 capsule 1 4/16/2025 -- --   Sig - Route: Take 1 capsule (145 mcg) by mouth every morning (before breakfast). - Oral     ----------------------  Last Visit Date: 4/16/2025  [facility] Visit Date: 0  ----------------------    Refill decision: Medication unable to be refilled by RN due to:     Medication not on refill protocol.          Request from pharmacy:  Requested Prescriptions   Pending Prescriptions Disp Refills    linaclotide (LINZESS) 145 MCG capsule 30 capsule 1     Sig: Take 1 capsule (145 mcg) by mouth every morning (before breakfast).       There is no refill protocol information for this order

## 2025-08-11 ENCOUNTER — MYC MEDICAL ADVICE (OUTPATIENT)
Dept: GASTROENTEROLOGY | Facility: CLINIC | Age: 20
End: 2025-08-11
Payer: COMMERCIAL

## 2025-08-25 ENCOUNTER — OFFICE VISIT (OUTPATIENT)
Dept: FAMILY MEDICINE | Facility: CLINIC | Age: 20
End: 2025-08-25
Payer: COMMERCIAL

## 2025-08-25 VITALS
OXYGEN SATURATION: 99 % | TEMPERATURE: 97.5 F | DIASTOLIC BLOOD PRESSURE: 64 MMHG | HEART RATE: 79 BPM | SYSTOLIC BLOOD PRESSURE: 116 MMHG | BODY MASS INDEX: 20.45 KG/M2 | WEIGHT: 151 LBS | HEIGHT: 72 IN | RESPIRATION RATE: 22 BRPM

## 2025-08-25 DIAGNOSIS — E55.9 VITAMIN D DEFICIENCY: ICD-10-CM

## 2025-08-25 DIAGNOSIS — B35.1 ONYCHOMYCOSIS: Primary | ICD-10-CM

## 2025-08-25 PROCEDURE — 3074F SYST BP LT 130 MM HG: CPT | Performed by: PHYSICIAN ASSISTANT

## 2025-08-25 PROCEDURE — 3078F DIAST BP <80 MM HG: CPT | Performed by: PHYSICIAN ASSISTANT

## 2025-08-25 PROCEDURE — 99213 OFFICE O/P EST LOW 20 MIN: CPT | Performed by: PHYSICIAN ASSISTANT

## 2025-08-25 RX ORDER — CICLOPIROX 80 MG/ML
SOLUTION TOPICAL
Qty: 6.6 ML | Refills: 11 | Status: SHIPPED | OUTPATIENT
Start: 2025-08-25

## 2025-08-25 RX ORDER — ERGOCALCIFEROL 1.25 MG/1
50000 CAPSULE, LIQUID FILLED ORAL WEEKLY
Qty: 12 CAPSULE | Refills: 0 | Status: SHIPPED | OUTPATIENT
Start: 2025-08-25

## (undated) DEVICE — ENDO FORCEP ENDOJAW BIOPSY 2.8MMX230CM FB-220U

## (undated) RX ORDER — PROPOFOL 10 MG/ML
INJECTION, EMULSION INTRAVENOUS
Status: DISPENSED
Start: 2024-02-26